# Patient Record
Sex: FEMALE | Race: WHITE
[De-identification: names, ages, dates, MRNs, and addresses within clinical notes are randomized per-mention and may not be internally consistent; named-entity substitution may affect disease eponyms.]

---

## 2019-11-27 ENCOUNTER — HOSPITAL ENCOUNTER (EMERGENCY)
Dept: HOSPITAL 35 - ER | Age: 49
Discharge: HOME | End: 2019-11-27
Payer: COMMERCIAL

## 2019-11-27 VITALS — WEIGHT: 200 LBS | BODY MASS INDEX: 32.14 KG/M2 | HEIGHT: 66 IN

## 2019-11-27 VITALS — DIASTOLIC BLOOD PRESSURE: 59 MMHG | SYSTOLIC BLOOD PRESSURE: 122 MMHG

## 2019-11-27 DIAGNOSIS — R55: ICD-10-CM

## 2019-11-27 DIAGNOSIS — R11.2: ICD-10-CM
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
## 2019-11-27 DIAGNOSIS — S00.83XA: Primary | ICD-10-CM
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
## 2019-11-27 LAB
ALBUMIN SERPL-MCNC: 3.8 G/DL (ref 3.4–5)
ALT SERPL-CCNC: 33 U/L (ref 30–65)
ANION GAP SERPL CALC-SCNC: 11 MMOL/L (ref 7–16)
AST SERPL-CCNC: 22 U/L (ref 15–37)
BASOPHILS NFR BLD AUTO: 0.3 % (ref 0–2)
BILIRUB SERPL-MCNC: 0.4 MG/DL
BUN SERPL-MCNC: 14 MG/DL (ref 7–18)
CALCIUM SERPL-MCNC: 9.6 MG/DL (ref 8.5–10.1)
CHLORIDE SERPL-SCNC: 104 MMOL/L (ref 98–107)
CO2 SERPL-SCNC: 25 MMOL/L (ref 21–32)
CREAT SERPL-MCNC: 0.9 MG/DL (ref 0.6–1)
EOSINOPHIL NFR BLD: 0.3 % (ref 0–3)
ERYTHROCYTE [DISTWIDTH] IN BLOOD BY AUTOMATED COUNT: 13.3 % (ref 10.5–14.5)
GLUCOSE SERPL-MCNC: 113 MG/DL (ref 74–106)
GRANULOCYTES NFR BLD MANUAL: 88.2 % (ref 36–66)
HCT VFR BLD CALC: 41.5 % (ref 37–47)
HGB BLD-MCNC: 14 GM/DL (ref 12–15)
LYMPHOCYTES NFR BLD AUTO: 5.7 % (ref 24–44)
MCH RBC QN AUTO: 30.2 PG (ref 26–34)
MCHC RBC AUTO-ENTMCNC: 33.8 G/DL (ref 28–37)
MCV RBC: 89.6 FL (ref 80–100)
MONOCYTES NFR BLD: 5.5 % (ref 1–8)
NEUTROPHILS # BLD: 8.5 THOU/UL (ref 1.4–8.2)
PLATELET # BLD: 291 THOU/UL (ref 150–400)
POTASSIUM SERPL-SCNC: 4.2 MMOL/L (ref 3.5–5.1)
PROT SERPL-MCNC: 7.4 G/DL (ref 6.4–8.2)
RBC # BLD AUTO: 4.63 MIL/UL (ref 4.2–5)
SODIUM SERPL-SCNC: 140 MMOL/L (ref 136–145)
TROPONIN I SERPL-MCNC: <0.06 NG/ML (ref ?–0.06)
WBC # BLD AUTO: 9.7 THOU/UL (ref 4–11)

## 2019-11-29 NOTE — EKG
[address]  [address]
Phone:  [phone]                    ELECTROCARDIOGRAM REPORT      
_______________________________________________________________________________
 
Name:       SID PURCELL                   [facility]                     [id number]     Account #:      [id number]  
Admission:  19    Attend Phys:                          
Discharge:  19    Date of Birth:  [birth year]  
                                                          Report #: [id number]
   [id number]
_______________________________________________________________________________
THIS REPORT FOR:   //name//                          
 
                         [facility] ED
                                       
Test Date:    2019               Test Time:    11:26:44
Pat Name:     SID PURCELL              Department:   
Patient ID:   [id number]            Room:          
Gender:       F                        Technician:   [facility]ly [facility]
:          1970               Requested By: Jimmy Cárdenas
Order Number: [id number] MD:   Harsha Wade
                                 Measurements
Intervals                              Axis          
Rate:         102                      P:            49
LA:           123                      QRS:          0
QRSD:         83                       T:            45
QT:           333                                    
QTc:          434                                    
                           Interpretive Statements
Sinus tachycardia
RSR' in V1 or V2, right VCD 
No previous ECG available for comparison
 
Electronically Signed On 2019 12:47:10 CST by Harsha Wade
[email or web address]
 
 
 
 
 
 
 
 
 
 
 
 
 
 
 
 
 
 
 
  <ELECTRONICALLY SIGNED>
   By: Harhsa Wade MD, [facility]   
  19     1247
D: 19 1126                           _____________________________________
T: 19 1126                           Harsha Wade MD, FACC     /EPI

## 2021-03-16 ENCOUNTER — HOSPITAL ENCOUNTER (OUTPATIENT)
Dept: HOSPITAL 35 - LAB | Age: 51
End: 2021-03-16
Attending: SPECIALIST
Payer: COMMERCIAL

## 2021-03-16 DIAGNOSIS — Z01.812: Primary | ICD-10-CM

## 2021-03-16 DIAGNOSIS — Z20.822: ICD-10-CM

## 2021-03-19 ENCOUNTER — HOSPITAL ENCOUNTER (OUTPATIENT)
Dept: HOSPITAL 35 - GI | Age: 51
Discharge: HOME | End: 2021-03-19
Attending: SPECIALIST
Payer: COMMERCIAL

## 2021-03-19 VITALS — WEIGHT: 200 LBS | BODY MASS INDEX: 32.14 KG/M2 | HEIGHT: 65.98 IN

## 2021-03-19 DIAGNOSIS — R19.7: ICD-10-CM

## 2021-03-19 DIAGNOSIS — M19.90: ICD-10-CM

## 2021-03-19 DIAGNOSIS — K21.9: ICD-10-CM

## 2021-03-19 DIAGNOSIS — Z79.899: ICD-10-CM

## 2021-03-19 DIAGNOSIS — R10.9: Primary | ICD-10-CM

## 2021-03-19 DIAGNOSIS — K31.9: ICD-10-CM

## 2021-03-19 DIAGNOSIS — Z98.890: ICD-10-CM

## 2021-03-19 DIAGNOSIS — R11.2: ICD-10-CM

## 2021-03-19 DIAGNOSIS — Z90.49: ICD-10-CM

## 2021-03-19 DIAGNOSIS — K44.9: ICD-10-CM

## 2021-03-19 PROCEDURE — 62110: CPT

## 2021-03-19 PROCEDURE — 62900: CPT

## 2021-03-19 NOTE — P
[facility]
[address]
[place]   [address]                     PROCEDURE REPORT              
_______________________________________________________________________________
 
Name:       SID PURCELL                   Room #:                     REG KANU LOZA#:      [id number]                       Account #:      [id number]  
Admission:  03/19/21    Attend Phys:    Corey Celis
Discharge:              Date of Birth:  [birth year]  
                                                          Report #: [id number]
                                                                    [id number]   
_______________________________________________________________________________
THIS REPORT FOR:  
 
cc:  Shira Villalba MD, Emily G. MD McElhinney, Christian C. MD                                   ~
 
DATE OF SERVICE:  03/19/2021
 
 
PROCEDURE PERFORMED:  Upper endoscopy with biopsies.
 
HISTORY OF PRESENT ILLNESS:  The patient is a 50-year-old female with a history
of intermittent nausea and vomiting, which can be months between episodes, she
has had several episodes over the last few years.  It is associated with
abdominal pain.  These episodes can last 10 or 15 hours.  She has tried
promethazine, which is minimally helpful during one of these episodes.  She does
have a history of gastroesophageal reflux disease.  She takes Prilosec on a
daily basis for a long period of time.  Denies any dysphagia or odynophagia. 
She does report some mild diarrhea at times.  Plan is for upper endoscopy.  The
patient has had a previous cholecystectomy.
 
DESCRIPTION OF PROCEDURE:  The risks and benefits of the procedure were
explained to the patient, those risks including but not limited to bleeding,
perforation and the risk of sedation.  She understood these risks and gave
informed consent.  Sedation was given using propofol per anesthesia.  Next,
using a standard Olympus upper endoscope, the scope was placed in the patient's
mouth and advanced under direct vision through the esophagus, stomach and into
the second portion of the duodenum.  The esophagus was normal throughout.  The
GE junction was normal.  Upon entering the stomach, a small hiatal hernia was
noted.  Overall, the gastric mucosa was normal.  Biopsies were obtained to rule
out H. pylori.  The pylorus was normal and patent.  The duodenal bulb, first and
second portion were all normal.  Biopsies were obtained to rule out the
possibility of celiac sprue.  The scope was then withdrawn and the procedure
terminated.  The patient tolerated the procedure well.
 
IMPRESSION:
1.  Small hiatal hernia.
2.  Otherwise, normal upper endoscopy.
 
RECOMMENDATIONS:
1.  Await biopsy results.
2.  Continue daily PPI therapy.
3.  A script for Zofran was given today to be used p.r.n. with her promethazine
when an episode occurs.
4.  Etiology of her intermittent nausea and vomiting is unclear, although this
may be due to cyclic vomiting.
 
 
 
[address]   [address]                     PROCEDURE REPORT              
_______________________________________________________________________________
 
Name:       SID PURCELL                   Room #:                     [facility]      [id number]                       Account #:      [id number]  
Admission:  03/19/21    Attend Phys:    Corey Celis
Discharge:              Date of Birth:  [birth year]  
                                                          Report #: [id number]
                                                                    [id number]   
_______________________________________________________________________________
 
5.  We discussed proceeding with a gastric emptying study if biopsies are
negative.
 
Thank you for allowing me to participate in her care.
 
 
 
 
 
 
 
 
 
 
 
 
 
 
 
 
 
 
 
 
 
 
 
 
 
 
 
 
 
 
 
 
 
 
 
 
 
 
 
  <ELECTRONICALLY SIGNED>
   By: Corey Hawthorne MD   
  03/19/21     2119
D: 03/19/21 0953                           _____________________________________
T: 03/19/21 1500                           Corey Hawthorne MD     /nt

## 2021-03-22 NOTE — PATH
[facility]
[address]
[place]   [address]                     PATHOLOGY RPT PROCEDURE       
_______________________________________________________________________________
 
Name:       CATHY PURCELL                   Room #:                     REG ANGEL 
AMANDA.#:      [id number]     Account #:      [id number]  
Admission:  03/19/21    Date of Birth:  [birth year]  
Discharge:                                              Report #:    [id number]
                                                        Path Case #: [id number]
_______________________________________________________________________________
 
[facility] Accession Number: [id number]
.                                                                01
Material submitted:                                        .
PART A: duodenum - BIOPSY DUODENUM
PART B: gastrointestinal site - GASTRIC BIOPSY
.                                                                01
Clinical history:                                          .
A:R/O SPRUE
B: R/O H PYLORI
VOMITING,GASTROENTERITIS
DTS/EGD
.                                                                02
**********************************************************************
Diagnosis:
A. Small bowel mucosa, duodenum, rule out sprue, endoscopic biopsy:
- No diagnostic abnormalities present.
- Negative for villous blunting or increase in intraepithelial
lymphocytes.
.
B. Gastric mucosa, gastric, rule out H. pylori, endoscopic biopsy:
- Mild reactive gastropathy.
- Negative for intestinal metaplasia or atrophy.
- Negative for Helicobacter pylori (properly controlled
immunohistochemical stain performed).
(IUV:pit 03/22/2021)
QTP  03/22/2021  1229 Local
**********************************************************************
.                                                                02
Electronically signed:                                     .
Dolly Garzon MD, Pathologist
NPI- [id number]
.                                                                01
Gross description:                                         .
A.  Received in formalin labeled "Steven, Cathy, BX duodenum rule out
sprue" are multiple tan-brown soft tissue fragments measuring in aggregate
0.9 x 0.4 x 0.1 cm. The specimen is submitted entirely in A1.
.
B.  Received in formalin labeled "Steven, Cathy, gastric BX rule out
pylori" and further labeled on the requisition as "rule out H. pylori" are
multiple tan-brown soft tissue fragments measuring in aggregate 0.7 x 0.5
x 0.1 cm. The specimen is submitted entirely in B1. ([facility]; 3/21/2021)
[facility]  03/21/2021  1230 Local
.                                                                02
Pathologist provided ICD-10:
K31.9, R11.10
.                                                                02
CPT                                                        .
 
 
[address]   [address]                     PATHOLOGY RPT PROCEDURE       
_______________________________________________________________________________
 
Name:       CATHY PURCELL                   Room #:                     REG [facility] 
DENIA#:      [id number]     Account #:      [id number]  
Admission:  03/19/21    Date of Birth:  [birth year]  
Discharge:                                              Report #:    [id number]
                                                        Path Case #: [id number]
_______________________________________________________________________________
[id number]
Specimen Comment: A courtesy copy of this report has been sent to [phone]
Specimen Comment: Report sent to 
***Performed at:  01
   [address], [place], [place]  [id number]
   MD Remington Parry MD Phone:  [phone]
***Performed at:  02
   [address]  [id number]
   MD Dolly Garzon MD Phone:  [phone]